# Patient Record
Sex: MALE | Race: BLACK OR AFRICAN AMERICAN | NOT HISPANIC OR LATINO | URBAN - METROPOLITAN AREA
[De-identification: names, ages, dates, MRNs, and addresses within clinical notes are randomized per-mention and may not be internally consistent; named-entity substitution may affect disease eponyms.]

---

## 2020-06-03 ENCOUNTER — OFFICE VISIT (OUTPATIENT)
Dept: URBAN - METROPOLITAN AREA CLINIC 109 | Facility: CLINIC | Age: 75
End: 2020-06-03

## 2020-06-12 ENCOUNTER — HOSPITAL ENCOUNTER (EMERGENCY)
Dept: HOSPITAL 5 - ED | Age: 75
Discharge: HOME | End: 2020-06-12
Payer: MEDICARE

## 2020-06-12 VITALS — DIASTOLIC BLOOD PRESSURE: 44 MMHG | SYSTOLIC BLOOD PRESSURE: 152 MMHG

## 2020-06-12 DIAGNOSIS — I10: ICD-10-CM
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
## 2020-06-12 DIAGNOSIS — Z85.46: ICD-10-CM

## 2020-06-12 DIAGNOSIS — S40.022A: ICD-10-CM

## 2020-06-12 DIAGNOSIS — Z86.73: ICD-10-CM

## 2020-06-12 DIAGNOSIS — S43.492A: Primary | ICD-10-CM
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
## 2020-06-12 DIAGNOSIS — E78.00: ICD-10-CM

## 2020-06-12 DIAGNOSIS — Z91.018: ICD-10-CM

## 2020-06-12 NOTE — EVENT NOTE
ED Screening Note


ED Screening Note: 








had a fall yesterday 


left shoulder pain and left wrist


walking with a walker and tripped


no LOC 








This initial assessment/diagnostic orders/clinical plan/treatment(s) is/are 

subject to change based on patients health status, clinical progression and re-

assessment by fellow clinical providers in the ED. Further treatment and workup 

at subsequent clinical providers discretion. Patient/guardian urged not to elope

from the ED as their condition may be serious if not clinically assessed and 

managed. 





Initial orders include: 


XR left wrist


XR left shoulder

## 2020-06-12 NOTE — EMERGENCY DEPARTMENT REPORT
Upper Extremity





- HPI


Chief Complaint: Extremity Injury, Upper


Stated Complaint: FALL


Time Seen by Provider: 06/12/20 10:53


Upper Extremity: Left Shoulder, Left Arm


Occurred When: 1 Day (yesterday)


Mechanism: Fall


Severity: severe


Symptoms: Yes Pain with Movement, Yes Limited Range of Movement, Yes Weakness


Other History: Mr. Hwang is a 74 yo male with hx of HTN, CVA, prostate CA, 

dyslipidemia who presents with left arm pain.  Patient fell down the driveway.  

He tripped over the curb yesterday.  He has left arm and left leg weakness due 

to stroke.  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  He has severe pain in the

left arm with limited range of motion. PCP Dr. Cruz King.  He walks with a 

walker.  His conditioning has declined.  He normall walks daily.  However, he 

has been unable to walk as frequently due to the current COVID-19 pandemic.





ED Review of Systems


ROS: 


Stated complaint: FALL


Other details as noted in HPI





Comment: All other systems reviewed and negative


Constitutional: denies: fever, malaise


Respiratory: denies: cough


Cardiovascular: denies: chest pain





ED Past Medical Hx





- Past Medical History


Previous Medical History?: Yes


Hx Hypertension: Yes


Hx CVA: Yes


Hx of Cancer: Yes (prostrate)


Additional medical history: high cholesterol





- Surgical History


Hx Internal Defibrillator: Yes





- Social History


Smoking Status: Never Smoker


Substance Use Type: None





Upper Extremity Exam





- Exam


General: 


Vital signs noted. No distress. Alert and acting appropriately.


Patient appears frail  Needs assistance to transfer from wheelchair to bed.  

Holds left arm in pain.





Left wrist drop evident, atrophic LUE contracted compared to RUE, deformity 

upper arm near shoulder, intact skin


Head and Torso: No HEENT Abnormality, No Neck Tenderness, No Chest/Lungs 

Abnormality, No Abdominal Tenderness, No Back Tenderness


Shoulder Exam: Yes Shoulder Tenderness, Yes Clavicle Tenderness, Yes Shoulder 

Deformity, Yes AC Joint Tenderness, No Normal Range of Motion in Shoulder


Arm Exam: Yes Arm/Humerus Tenderness, Yes Arm Deformity


Elbow: No Elbow Tenderness, No Normal Range of Motion in Elbow, No Elbow 

Deformity


Forearm: No Forearm Tenderness, No Forearm Deformity


Wrist: No Wrist Tenderness, No Normal ROM in Wrist, No Wrist Deformity, No 

Snuffbox Tenderness, No Pain with Axial Thumb Compression


Hand: No Hand Tenderness, No Hand Deformity, No Digit Tenderness, No Normal ROM 

in Digit(s)





ED Course


                                   Vital Signs











  06/12/20 06/12/20





  10:40 10:42


 


Temperature  98.8 F


 


Pulse Rate 69 


 


Respiratory 16 





Rate  


 


Blood Pressure 152/44 


 


O2 Sat by Pulse 97 





Oximetry  














ED Medical Decision Making





- Radiology Data


Radiology results: report reviewed





left hand: osteopenia no significant DJD





left humerus: no acute traumatic injury





left forearm: osteopenia no acute findings





- Medical Decision Making





Left arm contusion left shoulder sprain status post mechanical fall.  I strongly

encourage his caregiver/sister to request home physical therapy by PCP to 

restore his physical conditioning.





Sling provided for comfort: Recommended Tylenol over-the-counter for pain relief


Critical care attestation.: 


If time is entered above; I have spent that time in minutes in the direct care 

of this critically ill patient, excluding procedure time.








ED Disposition


Clinical Impression: 


 Contusion of left arm, Sprain of left shoulder





Disposition: DC-01 TO HOME OR SELFCARE


Is pt being admited?: No


Does the pt Need Aspirin: No


Condition: Stable


Instructions:  Shoulder Sprain (ED)


Referrals: 


PRIMARY CARE,MD [Primary Care Provider] - SRIDEVI CAMPOS MD [Staff Physician] - 3-5 Days

## 2020-06-12 NOTE — XRAY REPORT
LEFT HAND 2 VIEWS



INDICATION:  hand pain fall. 



COMPARISON: None.



IMPRESSION: Osteopenia is evident. No acute osseous or soft tissue abnormality.    No significant DJD
.





LEFT FOREARM 2 VIEWS



INDICATION:  Pain after fall. 



COMPARISON: None.



IMPRESSION: Osteopenia is evident. No acute osseous or soft tissue abnormality.    No significant DJD
.



Signer Name: Reymundo Rea Jr, MD 

Signed: 6/12/2020 12:29 PM

Workstation Name: [id number]

## 2020-06-12 NOTE — XRAY REPORT
LEFT HAND 2 VIEWS



INDICATION:  hand pain fall. 



COMPARISON: None.



IMPRESSION: Osteopenia is evident. No acute osseous or soft tissue abnormality.    No significant DJD
.





LEFT FOREARM 2 VIEWS



INDICATION:  Pain after fall. 



COMPARISON: None.



IMPRESSION: Osteopenia is evident. No acute osseous or soft tissue abnormality.    No significant DJD
.



Signer Name: Reymundo Rea Jr, MD 

Signed: 6/12/2020 12:29 PM

Workstation Name: [id number]

## 2020-06-12 NOTE — XRAY REPORT
XR humerus 2+V LT



INDICATION / CLINICAL INFORMATION:

arm pain.



COMPARISON:

None available.

 

FINDINGS:

BONES/JOINT(S): No acute fracture or subluxation. No significant degenerative changes. No focal bone 
lesion.



SOFT TISSUES: No significant abnormality.



ADDITIONAL FINDINGS: None.







Signer Name: Erlin Joseph MD 

Signed: 6/12/2020 12:32 PM

Workstation Name: [id number]

## 2020-06-17 ENCOUNTER — OFFICE VISIT (OUTPATIENT)
Dept: URBAN - METROPOLITAN AREA CLINIC 109 | Facility: CLINIC | Age: 75
End: 2020-06-17

## 2020-07-14 ENCOUNTER — OFFICE VISIT (OUTPATIENT)
Dept: URBAN - METROPOLITAN AREA CLINIC 118 | Facility: CLINIC | Age: 75
End: 2020-07-14
Payer: MEDICARE

## 2020-07-14 DIAGNOSIS — K62.5 RECTAL BLEED: ICD-10-CM

## 2020-07-14 PROCEDURE — G8420 CALC BMI NORM PARAMETERS: HCPCS | Performed by: INTERNAL MEDICINE

## 2020-07-14 PROCEDURE — G8427 DOCREV CUR MEDS BY ELIG CLIN: HCPCS | Performed by: INTERNAL MEDICINE

## 2020-07-14 PROCEDURE — 3017F COLORECTAL CA SCREEN DOC REV: CPT | Performed by: INTERNAL MEDICINE

## 2020-07-14 PROCEDURE — 1036F TOBACCO NON-USER: CPT | Performed by: INTERNAL MEDICINE

## 2020-07-14 PROCEDURE — G9903 PT SCRN TBCO ID AS NON USER: HCPCS | Performed by: INTERNAL MEDICINE

## 2020-07-14 PROCEDURE — 99204 OFFICE O/P NEW MOD 45 MIN: CPT | Performed by: INTERNAL MEDICINE

## 2020-07-14 RX ORDER — VALACYCLOVIR 500 MG/1
1 TABLET TABLET, FILM COATED ORAL ONCE A DAY
Status: ACTIVE | COMMUNITY

## 2020-07-14 RX ORDER — POTASSIUM CHLORIDE 1.5 G/1
1 PACKET WITH FOOD POWDER, FOR SOLUTION ORAL ONCE A DAY
Status: ACTIVE | COMMUNITY

## 2020-07-14 RX ORDER — TAMSULOSIN HYDROCHLORIDE 0.4 MG/1
1 CAPSULE CAPSULE ORAL ONCE A DAY
Status: ACTIVE | COMMUNITY

## 2020-07-14 RX ORDER — CLONIDINE 0.3 MG/D
1 PATCH TO SKIN PATCH TRANSDERMAL
Status: ACTIVE | COMMUNITY

## 2020-07-14 RX ORDER — AMLODIPINE BESYLATE 10 MG/1
1 TABLET TABLET ORAL ONCE A DAY
Status: ACTIVE | COMMUNITY

## 2020-07-14 RX ORDER — RAMIPRIL 10 MG/1
1 CAPSULE CAPSULE ORAL ONCE A DAY
Status: ACTIVE | COMMUNITY

## 2020-07-14 RX ORDER — SIMVASTATIN 20 MG/1
1 TABLET IN THE EVENING TABLET, FILM COATED ORAL ONCE A DAY
Status: ACTIVE | COMMUNITY

## 2020-07-14 RX ORDER — BICALUTAMIDE 50 MG/1
1 TABLET TABLET ORAL ONCE A DAY
Status: ACTIVE | COMMUNITY

## 2020-07-14 RX ORDER — CHOLECALCIFEROL (VITAMIN D3) 2400/ML
AS DIRECTED LIQUID (ML) MISCELLANEOUS
Status: ACTIVE | COMMUNITY

## 2020-07-14 RX ORDER — HYDROCHLOROTHIAZIDE 12.5 MG/1
1 CAPSULE IN THE MORNING CAPSULE ORAL ONCE A DAY
Status: ACTIVE | COMMUNITY

## 2020-07-14 RX ORDER — CLOPIDOGREL 75 MG/1
1 TABLET TABLET, FILM COATED ORAL ONCE A DAY
Status: ACTIVE | COMMUNITY

## 2020-07-14 NOTE — PHYSICAL EXAM CONSTITUTIONAL:
well developed, thin, in no acute distress , ambulating with difficulty , normal communication ability

## 2020-07-14 NOTE — HPI-TODAY'S VISIT:
74 yo BM, here for a hx of blood on floor of bathroom 2 wks ago.  Pt's sister states he had a positive Cologuard prior to the pandemic.  BMs qd, no change.  Pt had prostate cancer with XRT 10/2019, and had diarrhea and rectal pain immediately afterwards.  Since then he has had occ rectal pain.  No abd pain, N/V.  He has good appetite.  He lost 10# over the last 6 months. No CP or SOB.

## 2020-07-17 ENCOUNTER — OFFICE VISIT (OUTPATIENT)
Dept: URBAN - METROPOLITAN AREA CLINIC 118 | Facility: CLINIC | Age: 75
End: 2020-07-17

## 2020-08-26 ENCOUNTER — OFFICE VISIT (OUTPATIENT)
Dept: URBAN - METROPOLITAN AREA CLINIC 118 | Facility: CLINIC | Age: 75
End: 2020-08-26
Payer: MEDICARE

## 2020-08-26 ENCOUNTER — LAB OUTSIDE AN ENCOUNTER (OUTPATIENT)
Dept: URBAN - METROPOLITAN AREA CLINIC 118 | Facility: CLINIC | Age: 75
End: 2020-08-26

## 2020-08-26 ENCOUNTER — DASHBOARD ENCOUNTERS (OUTPATIENT)
Age: 75
End: 2020-08-26

## 2020-08-26 DIAGNOSIS — K62.5 RECTAL BLEED: ICD-10-CM

## 2020-08-26 PROCEDURE — G8420 CALC BMI NORM PARAMETERS: HCPCS | Performed by: INTERNAL MEDICINE

## 2020-08-26 PROCEDURE — 1036F TOBACCO NON-USER: CPT | Performed by: INTERNAL MEDICINE

## 2020-08-26 PROCEDURE — 99213 OFFICE O/P EST LOW 20 MIN: CPT | Performed by: INTERNAL MEDICINE

## 2020-08-26 PROCEDURE — 3017F COLORECTAL CA SCREEN DOC REV: CPT | Performed by: INTERNAL MEDICINE

## 2020-08-26 PROCEDURE — G8427 DOCREV CUR MEDS BY ELIG CLIN: HCPCS | Performed by: INTERNAL MEDICINE

## 2020-08-26 RX ORDER — POTASSIUM CHLORIDE 1.5 G/1
1 PACKET WITH FOOD POWDER, FOR SOLUTION ORAL ONCE A DAY
Status: ACTIVE | COMMUNITY

## 2020-08-26 RX ORDER — POLYETHYLENE GLYOCOL 3350, SODIUM CHLORIDE, SODIUM BICARBONATE AND POTASSIUM CHLORIDE 420; 11.2; 5.72; 1.48 G/4L; G/4L; G/4L; G/4L
AS DIRECTED POWDER, FOR SOLUTION NASOGASTRIC; ORAL ONCE
Qty: 1 | Refills: 0 | OUTPATIENT
Start: 2020-08-26 | End: 2020-08-27

## 2020-08-26 RX ORDER — BICALUTAMIDE 50 MG/1
1 TABLET TABLET ORAL ONCE A DAY
Status: ACTIVE | COMMUNITY

## 2020-08-26 RX ORDER — TAMSULOSIN HYDROCHLORIDE 0.4 MG/1
1 CAPSULE CAPSULE ORAL ONCE A DAY
Status: ACTIVE | COMMUNITY

## 2020-08-26 RX ORDER — CLONIDINE 0.3 MG/D
1 PATCH TO SKIN PATCH TRANSDERMAL
Status: ACTIVE | COMMUNITY

## 2020-08-26 RX ORDER — CHOLECALCIFEROL (VITAMIN D3) 2400/ML
AS DIRECTED LIQUID (ML) MISCELLANEOUS
Status: ACTIVE | COMMUNITY

## 2020-08-26 RX ORDER — RAMIPRIL 10 MG/1
1 CAPSULE CAPSULE ORAL ONCE A DAY
Status: ACTIVE | COMMUNITY

## 2020-08-26 RX ORDER — CLOPIDOGREL 75 MG/1
1 TABLET TABLET, FILM COATED ORAL ONCE A DAY
Status: ACTIVE | COMMUNITY

## 2020-08-26 RX ORDER — VALACYCLOVIR 500 MG/1
1 TABLET TABLET, FILM COATED ORAL ONCE A DAY
Status: ACTIVE | COMMUNITY

## 2020-08-26 RX ORDER — SIMVASTATIN 20 MG/1
1 TABLET IN THE EVENING TABLET, FILM COATED ORAL ONCE A DAY
Status: ACTIVE | COMMUNITY

## 2020-08-26 RX ORDER — AMLODIPINE BESYLATE 10 MG/1
1 TABLET TABLET ORAL ONCE A DAY
Status: ACTIVE | COMMUNITY

## 2020-08-26 RX ORDER — HYDROCHLOROTHIAZIDE 12.5 MG/1
1 CAPSULE IN THE MORNING CAPSULE ORAL ONCE A DAY
Status: ACTIVE | COMMUNITY

## 2020-08-26 NOTE — PHYSICAL EXAM CONSTITUTIONAL:
well developed, well nourished , in no acute distress , ambulating with cane with difficulty, able to communicate, but has mild expressive dysphasia. L hemiparesis

## 2020-08-26 NOTE — HPI-TODAY'S VISIT:
Pt here for follow up.  Pt has ongoing rectal bleeding with blood dripping after BM several x/wk.  No abd pain, N/V.  Appetite good. Have received Cardiac clearance from Dr. Abad.  Pt still on Plavix.  No CP or SOB.

## 2020-08-27 LAB
HEMATOCRIT: 33.8
HEMOGLOBIN: 10.7
MCH: 29.5
MCHC: 31.7
MCV: 93
NRBC: (no result)
PLATELETS: 317
RBC: 3.63
RDW: 13.2
WBC: 4.3

## 2020-09-11 ENCOUNTER — OFFICE VISIT (OUTPATIENT)
Dept: URBAN - METROPOLITAN AREA MEDICAL CENTER 16 | Facility: MEDICAL CENTER | Age: 75
End: 2020-09-11
Payer: MEDICARE
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
## 2020-09-11 DIAGNOSIS — K62.7 PROCTITIS, RADIATION: ICD-10-CM

## 2020-09-11 DIAGNOSIS — R19.5 ABNORMAL FECES: ICD-10-CM

## 2020-09-11 PROCEDURE — 45378 DIAGNOSTIC COLONOSCOPY: CPT | Performed by: INTERNAL MEDICINE

## 2020-09-11 PROCEDURE — G9937 DIG OR SURV COLSCO: HCPCS | Performed by: INTERNAL MEDICINE

## 2020-09-11 RX ORDER — POTASSIUM CHLORIDE 1.5 G/1
1 PACKET WITH FOOD POWDER, FOR SOLUTION ORAL ONCE A DAY
Status: ACTIVE | COMMUNITY

## 2020-09-11 RX ORDER — BICALUTAMIDE 50 MG/1
1 TABLET TABLET ORAL ONCE A DAY
Status: ACTIVE | COMMUNITY

## 2020-09-11 RX ORDER — AMLODIPINE BESYLATE 10 MG/1
1 TABLET TABLET ORAL ONCE A DAY
Status: ACTIVE | COMMUNITY

## 2020-09-11 RX ORDER — TAMSULOSIN HYDROCHLORIDE 0.4 MG/1
1 CAPSULE CAPSULE ORAL ONCE A DAY
Status: ACTIVE | COMMUNITY

## 2020-09-11 RX ORDER — VALACYCLOVIR 500 MG/1
1 TABLET TABLET, FILM COATED ORAL ONCE A DAY
Status: ACTIVE | COMMUNITY

## 2020-09-11 RX ORDER — RAMIPRIL 10 MG/1
1 CAPSULE CAPSULE ORAL ONCE A DAY
Status: ACTIVE | COMMUNITY

## 2020-09-11 RX ORDER — CLOPIDOGREL 75 MG/1
1 TABLET TABLET, FILM COATED ORAL ONCE A DAY
Status: ACTIVE | COMMUNITY

## 2020-09-11 RX ORDER — SIMVASTATIN 20 MG/1
1 TABLET IN THE EVENING TABLET, FILM COATED ORAL ONCE A DAY
Status: ACTIVE | COMMUNITY

## 2020-09-11 RX ORDER — CHOLECALCIFEROL (VITAMIN D3) 2400/ML
AS DIRECTED LIQUID (ML) MISCELLANEOUS
Status: ACTIVE | COMMUNITY

## 2020-09-11 RX ORDER — HYDROCHLOROTHIAZIDE 12.5 MG/1
1 CAPSULE IN THE MORNING CAPSULE ORAL ONCE A DAY
Status: ACTIVE | COMMUNITY

## 2020-09-11 RX ORDER — CLONIDINE 0.3 MG/D
1 PATCH TO SKIN PATCH TRANSDERMAL
Status: ACTIVE | COMMUNITY

## 2022-05-02 ENCOUNTER — HOSPITAL ENCOUNTER (EMERGENCY)
Dept: HOSPITAL 5 - ED | Age: 77
LOS: 1 days | Discharge: LEFT BEFORE DISCHARGE | End: 2022-05-03
Payer: MEDICARE

## 2022-05-02 VITALS — SYSTOLIC BLOOD PRESSURE: 138 MMHG | DIASTOLIC BLOOD PRESSURE: 37 MMHG

## 2022-05-02 DIAGNOSIS — Z53.21: Primary | ICD-10-CM
